# Patient Record
(demographics unavailable — no encounter records)

---
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
## 2024-12-16 NOTE — SOCIAL HISTORY
[Cessation strategies including cessation program discussed] : Cessation strategies including cessation program discussed [Use of nicotine replacement therapies and other medications discussed] : Use of nicotine replacement therapies and other medications discussed [Encouraged to pick a quit date and identify support needed to quit] : Encouraged to pick a quit date and identify support needed to quit [Smoking Cessation Program Referral] : Smoking Cessation Program Referral  [Yes] : Willing to quit smoking [FreeTextEntry2] : Please note he has polysubstance abuse with alcohol and marijuana in addition to cigarettes [FreeTextEntry1] : 5

## 2024-12-16 NOTE — PHYSICAL EXAM
[General Appearance - Well Developed] : well developed [General Appearance - Well Nourished] : well nourished [Normal Appearance] : normal appearance [Well Groomed] : well groomed [Edema] : no peripheral edema [General Appearance - In No Acute Distress] : no acute distress [Respiration, Rhythm And Depth] : normal respiratory rhythm and effort [Exaggerated Use Of Accessory Muscles For Inspiration] : no accessory muscle use [Abdomen Soft] : soft [Abdomen Tenderness] : non-tender [Abdomen Hernia] : no hernia was discovered [Costovertebral Angle Tenderness] : no ~M costovertebral angle tenderness [Penis Abnormality] : normal circumcised penis [Urinary Bladder Findings] : the bladder was normal on palpation [Normal Station and Gait] : the gait and station were normal for the patient's age [] : no rash [No Focal Deficits] : no focal deficits [Oriented To Time, Place, And Person] : oriented to person, place, and time [Affect] : the affect was normal [Mood] : the mood was normal [Not Anxious] : not anxious [FreeTextEntry1] : 24.56 [de-identified] : thickened indurated corpora with ventral tethering [Chaperone Present] : A chaperone was present in the examining room during all aspects of the physical examination [FreeTextEntry2] : Elsa

## 2024-12-16 NOTE — ADDENDUM
[FreeTextEntry1] : I reviewed the notes laboratory values from the emergency room including as far back as 2022 as well as the more recent appointment, discussed his past history of substance abuse and made the short-term recommendations while we work on the
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
## 2024-12-16 NOTE — LETTER HEADER
[FreeTextEntry3] : Maira Welch MD [facility], [address] [place] [address]

## 2025-01-27 NOTE — ASSESSMENT
[FreeTextEntry1] : The substance abuse team says they called him 3 times left their phone number he tells me he thought they would keep calling him back I do not understand The blood tests were not done as he said he thought he would have a slip at the pharmacy??)  He did not have 1 instead of calling us and saying what should he do about the bloods he just did not do it He has been limiting his alcohol and though he did have a prolonged erection was only an hour and a half and it was without pain  I do not know how to help him if he does not help me help him.  I will reorder the blood test, I gave him the number of the substance abuse team he will come back in a month and if I can I will help him.  No one can help him until he wants help.

## 2025-01-27 NOTE — LETTER HEADER
[FreeTextEntry3] : Maira Welch MD [facility], [address] [place] [address]

## 2025-01-27 NOTE — ADDENDUM
[FreeTextEntry1] : Longitudinal care (CPT code ): - The patient is diagnosed with erectile dysfunction , which is a chronic condition due to lifestyle choices or metabolic dysregulation, and requires longitudinal care to prevent disease progression and systemic complications.  we discussed the risks of non compliance and the possibility of bad outcomes even with compliance
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
## 2025-03-05 NOTE — LETTER HEADER
[FreeTextEntry3] : Maira Welch MD [facility] [facility], [address] [place] [address]

## 2025-03-05 NOTE — ASSESSMENT
[FreeTextEntry1] : By cutting back on the alcohol and the cigarettes he is doing a lot better without any intervention.  I do not have his bioavailable testosterone but the rest of his hormones are normal.  I should hopefully get that back by Monday he will send me an email.  If everything is doing well and I do not think he needs to see someone.  If he starts having problems then if it is before April 30 he will come back and see me if it is after April 30, the day I am retiring, I would recommend he see Dr. Atwood who is brought on as the director of men's health